# Patient Record
Sex: FEMALE | Race: WHITE
[De-identification: names, ages, dates, MRNs, and addresses within clinical notes are randomized per-mention and may not be internally consistent; named-entity substitution may affect disease eponyms.]

---

## 2019-07-22 ENCOUNTER — HOSPITAL ENCOUNTER (EMERGENCY)
Dept: HOSPITAL 97 - ER | Age: 29
LOS: 1 days | Discharge: HOME | End: 2019-07-23
Payer: COMMERCIAL

## 2019-07-22 DIAGNOSIS — K92.2: Primary | ICD-10-CM

## 2019-07-22 LAB
ALBUMIN SERPL BCP-MCNC: 4 G/DL (ref 3.4–5)
ALP SERPL-CCNC: 66 U/L (ref 45–117)
ALT SERPL W P-5'-P-CCNC: 19 U/L (ref 12–78)
AST SERPL W P-5'-P-CCNC: 7 U/L (ref 15–37)
BUN BLD-MCNC: 12 MG/DL (ref 7–18)
GLUCOSE SERPLBLD-MCNC: 93 MG/DL (ref 74–106)
HCT VFR BLD CALC: 39.4 % (ref 36–45)
INR BLD: 1
LIPASE SERPL-CCNC: 152 U/L (ref 73–393)
LYMPHOCYTES # SPEC AUTO: 3.2 K/UL (ref 0.7–4.9)
PMV BLD: 8.3 FL (ref 7.6–11.3)
POTASSIUM SERPL-SCNC: 3.6 MMOL/L (ref 3.5–5.1)
RBC # BLD: 4.35 M/UL (ref 3.86–4.86)

## 2019-07-22 PROCEDURE — 80076 HEPATIC FUNCTION PANEL: CPT

## 2019-07-22 PROCEDURE — 83690 ASSAY OF LIPASE: CPT

## 2019-07-22 PROCEDURE — 99284 EMERGENCY DEPT VISIT MOD MDM: CPT

## 2019-07-22 PROCEDURE — 36415 COLL VENOUS BLD VENIPUNCTURE: CPT

## 2019-07-22 PROCEDURE — 85025 COMPLETE CBC W/AUTO DIFF WBC: CPT

## 2019-07-22 PROCEDURE — 85610 PROTHROMBIN TIME: CPT

## 2019-07-22 PROCEDURE — 80048 BASIC METABOLIC PNL TOTAL CA: CPT

## 2019-07-22 NOTE — XMS REPORT
Patient Summary Document

 Created on:2019



Patient:MANUEL GARAY

Sex:Female

:1990

External Reference #:[id number]





Demographics







 Address  [address] [place] [address]

 

 Home Phone  [phone]

 

 Work Phone  [phone]

 

 Email Address  [email or web address]

 

 Preferred Language  Unknown

 

 Marital Status  Unknown

 

 Hoahaoism Affiliation  Unknown

 

 Race  Unknown

 

 Additional Race(s)  Unavailable

 

 Ethnic Group  Unknown









Author







 Organization  [facility]

 

 Address  [address] [place] [address] [address]



   [place] [address]

 

 Phone  [phone]









Support







 Name  Relationship  Address  Phone

 

 NOEMÍ GARAY  Unavailable  [address] [place] [address]  [phone]



     [place] [address]  

 

 NOEMÍ GARAY  Unavailable  [address] [place] [address]  [phone]



     [place] [address]  

 

 TANISHA NOEMÍ  Unavailable  [address] [place] [address]   [phone]



     [place] [address]  









Care Team Providers







 Name  Role  Phone

 

 Unavailable  Unavailable  Unavailable









Payers







 Payer Name  Policy Type  Policy Number  Effective Date  Expiration Date







Problems

This patient has no known problems.



Allergies, Adverse Reactions, Alerts

This patient has no known allergies or adverse reactions.



Medications

This patient has no known medications.



Results







 Test Description  Test Time  Test Comments  Text Results  Atomic Results  
Result Comments









 - XR HYSTROSALPNGOGRAM  2019 16:01:00     Patient Name: MANUEL GARAY
  



               Unit No: [id number]  



       EXAMS:  



                     CPT CODE:  



       [id number] XR HYSTROSALPNGOGRAM  



                     [id number]  



       HYSTEROSALPINGOGRAM,2019  



       Radiation dose:  6.88 mGy  Fluoro  



       time: 22 seconds     CLINICAL HISTORY:  



       Infertility testing     COMPARISON:  



       None     FINDINGS:  



       Hysterosalpingogram was attempted by  



       Dr. Serna.     Uterine cavity was not  



       able to be cannulated and the  



       examination was  terminated by Dr. Serna.     CONCLUSION: Unsuccessful  



       hysterosalpingogram.          **  



       Electronically Signed by Elier Anand MD on 2019 at 1601 **  



            Reported and signed by: Elier Anand MD  



         CC: Kelsie Serna  



         



         



         



       Technologist: Kavita Fernandez RT; Jennifer [place]  



       Trnscrbd D/T: 2019 (1601)  



       [id number]  



       Orig Print D/T: S: 2019 (1604)  



                                  [facility]       NAME:  



       MANUEL GARAY  



       Radiology Department  



       PHYS: REJI.Alicia - Kelsie Serna MD  



       [address]  



       : 1990 AGE: 28      SEX: F  



       [place], [place] [address]  



       ACCT NO: [id number] LOC: F.RAD  



        PHONE #: [phone]  



       EXAM DATE: 2019 STATUS: REG CLI  



          FAX #: [phone]  



       RAD NO:            Page  1  



               Signed Report

## 2019-07-23 NOTE — ER
Nurse's Notes                                                                                     

 [facility]                                                                 

Name: Bailey Cobian                                                                            

Age: 29 yrs                                                                                       

Sex: Female                                                                                       

: 1990                                                                                   

MRN: [id number]                                                                                   

Arrival Date: 2019                                                                          

Time: 21:24                                                                                       

Account#: [id number]                                                                            

Bed 23                                                                                            

Private MD:                                                                                       

Diagnosis: Gastrointestinal hemorrhage, unspecified                                               

                                                                                                  

Presentation:                                                                                     

                                                                                             

21:29 Presenting complaint: Patient states: bright red blood from rectum started at 1730      ak1 

      today with BM. pt denies constipation. pt had IUI Saturday for the 4th time. pt c/o abd     

      cramps today. pt stated blood is when she wipes. Transition of care: patient was not        

      received from another setting of care. Onset of symptoms was 2019. Risk            

      Assessment: Do you want to hurt yourself or someone else? Patient reports no desire to      

      harm self or others. Initial Sepsis Screen: Does the patient meet any 2 criteria? No.       

      Patient's initial sepsis screen is negative. Does the patient have a suspected source       

      of infection? No. Patient's initial sepsis screen is negative. Care prior to arrival:       

      None.                                                                                       

21:29 Method Of Arrival: Ambulatory                                                           ak1 

21:29 Acuity: EMILIANA 3                                                                           ak1 

                                                                                                  

Triage Assessment:                                                                                

21:31 General: Appears in no apparent distress. Behavior is cooperative, anxious. Pain:       ak1 

      Complains of pain in pelvis.                                                                

                                                                                                  

OB/GYN:                                                                                           

21:31 LMP 7/10/2019                                                                           ak1 

                                                                                                  

Historical:                                                                                       

- Allergies:                                                                                      

21:31 No Known Allergies;                                                                     ak1 

- Home Meds:                                                                                      

21:31 None [Active];                                                                          ak1 

- PMHx:                                                                                           

21:31 None;                                                                                   ak1 

- PSHx:                                                                                           

21:31 None;                                                                                   ak1 

                                                                                                  

- Immunization history:: Adult Immunizations up to date.                                          

- Social history:: Smoking status: Patient/guardian denies using tobacco.                         

- Ebola Screening: : No symptoms or risks identified at this time.                                

                                                                                                  

                                                                                                  

Screenin:32 Abuse screen: Denies threats or abuse. Denies injuries from another. Nutritional        ak1 

      screening: No deficits noted. Tuberculosis screening: No symptoms or risk factors           

      identified. Fall Risk None identified.                                                      

                                                                                                  

Assessment:                                                                                       

22:20 General: Appears in no apparent distress. comfortable, Behavior is calm, cooperative,   ca1 

      appropriate for age. Pain: Denies pain. Neuro: Level of Consciousness is awake, alert,      

      obeys commands, Oriented to person, place, time, situation, Appropriate for age.            

      Cardiovascular: Heart tones S1 S2 present Capillary refill < 3 seconds Patient's skin       

      is warm and dry. Pulses. Respiratory: Airway is patent Respiratory effort is even,          

      unlabored, Respiratory pattern is regular, symmetrical, Breath sounds are clear             

      bilaterally. GI: Abdomen is flat, non-distended, Bowel sounds present X 4 quads. Abd is     

      soft and non tender X 4 quads. Reports rectal bleeding, since 1700H today. : No           

      deficits noted. No signs and/or symptoms were reported regarding the genitourinary          

      system. EENT: No deficits noted. No signs and/or symptoms were reported regarding the       

      EENT system. Derm: Skin is intact, is healthy with good turgor, Skin is pink, warm \T\      

      dry. Musculoskeletal: Circulation, motion, and sensation intact. Capillary refill < 3       

      seconds, Range of motion: intact in all extremities.                                        

23:20 Reassessment: Patient appears in no apparent distress at this time. No changes from     ca1 

      previously documented assessment. Patient and/or family updated on plan of care and         

      expected duration. Pain level reassessed. Patient is alert, oriented x 3, equal             

      unlabored respirations, skin warm/dry/pink.                                                 

                                                                                             

00:39 Reassessment: Patient appears in no apparent distress at this time. Patient is alert,   ca1 

      oriented x 3, equal unlabored respirations, skin warm/dry/pink.                             

                                                                                                  

Vital Signs:                                                                                      

                                                                                             

21:31  / 76; Pulse 82; Resp 16; Temp 97.7; Pulse Ox 98% on R/A; Weight 56.7 kg (R);     ak1 

      Height 5 ft. 4 in. (162.56 cm) (R); Pain 2/10;                                              

22:20  / 70; Pulse 72; Resp 16 S; Pulse Ox 100% on R/A;                                 ca1 

23:20  / 74; Pulse 76; Resp 16 S; Pulse Ox 100% on R/A;                                 ca1 

                                                                                             

00:30  / 63; Pulse 66; Resp 15 S; Temp 97.9(O); Pulse Ox 99% on R/A;                    ca1 

                                                                                             

21:31 Body Mass Index 21.46 (56.70 kg, 162.56 cm)                                             ak1 

                                                                                                  

ED Course:                                                                                        

                                                                                             

21:24 Patient arrived in ED.                                                                  es  

21:31 Triage completed.                                                                       ak1 

21:31 Arm band placed on Patient placed in waiting room, Patient notified of wait time.       ak1 

21:32 Patient has correct armband on for positive identification.                             ak1 

22:05 Dominic Rendon PA is PHCP.                                                                cp  

22:05 Wallace Hernández MD is Attending Physician.                                            cp  

22:06 Shaista Carmona, RN is Primary Nurse.                                                      ca1 

22:20 Placed in gown. Bed in low position. Call light in reach. Side rails up X 1. Pulse ox   ca1 

      on. NIBP on. Warm blanket given.                                                            

23:20 No provider procedures requiring assistance completed. Inserted saline lock: 22 gauge   ca1 

      in right antecubital area, using aseptic technique. Blood collected.                        

                                                                                             

00:00 Seng Cole MD is Referral Physician.                                              cp  

00:40 IV discontinued, intact, bleeding controlled, No redness/swelling at site. Pressure     ca1 

      dressing applied.                                                                           

                                                                                                  

Administered Medications:                                                                         

No medications were administered                                                                  

                                                                                                  

                                                                                                  

Outcome:                                                                                          

00:01 Discharge ordered by MD.                                                                cp  

00:40 Discharged to home ambulatory, with significant other.                                  ca1 

00:40 Condition: stable                                                                           

00:40 Discharge instructions given to patient, Instructed on discharge instructions, follow       

      up and referral plans. Demonstrated understanding of instructions, follow-up care.          

00:42 Patient left the ED.                                                                    ca1 

                                                                                                  

Signatures:                                                                                       

Elissa Phelan Amber, RN                       RN   ak1                                                  

Dominic Rendon PA PA cp Acob, Cheryl, RN                        RN   ca1                                                  

                                                                                                  

**************************************************************************************************

## 2019-07-23 NOTE — EDPHYS
Physician Documentation                                                                           

 [facility]                                                                 

Name: Bailey Cobian                                                                            

Age: 29 yrs                                                                                       

Sex: Female                                                                                       

: 1990                                                                                   

MRN: [id number]                                                                                   

Arrival Date: 2019                                                                          

Time: 21:24                                                                                       

Account#: [id number]                                                                            

Bed 23                                                                                            

Private MD:                                                                                       

ED Physician Wallace Hernández                                                                     

HPI:                                                                                              

                                                                                             

23:25 This 29 yrs old  Female presents to ER via Ambulatory with complaints of       cp  

      Rectal Bleeding.                                                                            

23:25 The patient presents to the emergency department with bleeding from the rectum/anus,    cp  

      that is mild. Onset: The symptoms/episode began/occurred today. Associate signs and         

      symptoms: Pertinent negatives: abdominal pain, constipation, diarrhea, fever, vomiting.     

      Patient reports having lower abdominal cramping pain today and noticing blood with firm     

      bowel movement today.                                                                       

23:25 Patient reports having intrauterine pregnancy procedure this past Saturday.             cp  

                                                                                                  

OB/GYN:                                                                                           

21:31 LMP 7/10/2019                                                                           ak1 

                                                                                                  

Historical:                                                                                       

- Allergies:                                                                                      

21:31 No Known Allergies;                                                                     ak1 

- Home Meds:                                                                                      

21:31 None [Active];                                                                          ak1 

- PMHx:                                                                                           

21:31 None;                                                                                   ak1 

- PSHx:                                                                                           

21:31 None;                                                                                   ak1 

                                                                                                  

- Immunization history:: Adult Immunizations up to date.                                          

- Social history:: Smoking status: Patient/guardian denies using tobacco.                         

- Ebola Screening: : No symptoms or risks identified at this time.                                

                                                                                                  

                                                                                                  

ROS:                                                                                              

23:30 Constitutional: Negative for body aches, chills, fever, poor PO intake.                 cp  

23:30 Eyes: Negative for injury, pain, redness, and discharge.                                cp  

23:30 Cardiovascular: Negative for chest pain, palpitations.                                      

23:30 Respiratory: Negative for cough, shortness of breath, wheezing.                             

23:30 Abdomen/GI: Positive for blood in stool, Negative for vomiting, diarrhea, constipation,     

      anorexia.                                                                                   

23:30 Back: Negative for pain at rest, pain with movement, radiated pain.                         

23:30 : Negative for urinary symptoms, vaginal bleeding.                                        

23:30 Neuro: Negative for altered mental status, headache, weakness.                              

23:30 All other systems are negative.                                                             

                                                                                                  

Exam:                                                                                             

23:35 Constitutional: The patient appears in no acute distress, alert, awake, non-toxic, well cp  

      developed, well nourished.                                                                  

23:35 Head/Face:  Normocephalic, atraumatic.                                                  cp  

23:35 Eyes: Periorbital structures: appear normal, Conjunctiva: normal, no exudate, no            

      injection, Sclera: no appreciated abnormality, Lids and lashes: appear normal,              

      bilaterally.                                                                                

23:35 ENT: External ear(s): are unremarkable, Nose: is normal, Mouth: is normal, Posterior        

      pharynx: is normal, airway is patent, no erythema, no exudate.                              

23:35 Chest/axilla: Inspection: normal, Palpation: is normal, no crepitus, no tenderness.         

23:35 Cardiovascular: Rate: normal, Rhythm: regular.                                              

23:35 Respiratory: the patient does not display signs of respiratory distress,  Respirations:     

      normal, no use of accessory muscles, no retractions, no splinting, no tachypnea,            

      labored breathing, is not present, Breath sounds: are clear throughout, no decreased        

      breath sounds, no stridor, no wheezing.                                                     

23:35 Abdomen/GI: Inspection: abdomen appears normal, Bowel sounds: active, all quadrants,        

      Palpation: soft, in all quadrants, nontender, in all quadrants, rebound tenderness, is      

      not appreciated, voluntary guarding, is not appreciated, involuntary guarding, is not       

      appreciated, Rectal exam: Stool: brown, guaiac positive.                                    

                                                                                                  

Vital Signs:                                                                                      

21:31  / 76; Pulse 82; Resp 16; Temp 97.7; Pulse Ox 98% on R/A; Weight 56.7 kg (R);     ak1 

      Height 5 ft. 4 in. (162.56 cm) (R); Pain 2/10;                                              

22:20  / 70; Pulse 72; Resp 16 S; Pulse Ox 100% on R/A;                                 ca1 

23:20  / 74; Pulse 76; Resp 16 S; Pulse Ox 100% on R/A;                                 ca1 

                                                                                             

00:30  / 63; Pulse 66; Resp 15 S; Temp 97.9(O); Pulse Ox 99% on R/A;                    ca1 

                                                                                             

21:31 Body Mass Index 21.46 (56.70 kg, 162.56 cm)                                             ak1 

                                                                                                  

MDM:                                                                                              

                                                                                             

22:11 Patient medically screened.                                                             cp  

23:30 Differential diagnosis: hemorrhoids, fissure, colitis.                                    

                                                                                             

00:01 Data reviewed: vital signs, nurses notes, lab test result(s), and as a result, I will   cp  

      discharge patient.                                                                          

00:01 Refusal of service: The patient/guardian displays adequate decision making capability   cp  

      and despite a detailed discussion of alternatives, benefits, risks, and consequences        

      refuses: CT Scan. Special discussion: Based on the patient's Hx, exam, and Dx               

      evaluation, there is no indication for emergent surgery or inpatient Tx. It is              

      understood by the patient/guardian that if the Sx's persist or worsen they need to          

      return immediately for re-evaluation.                                                       

                                                                                                  

                                                                                             

23:17 Order name: Basic Metabolic Panel; Complete Time: 23:56                                   

                                                                                             

23:56 Interpretation: Normal except: .                                                    

                                                                                             

23:17 Order name: CBC with Diff; Complete Time: 23:56                                           

                                                                                             

23:56 Interpretation: Reviewed.                                                                 

                                                                                             

23:17 Order name: Creatinine for Radiology; Complete Time: 23:56                                

                                                                                             

23:17 Order name: Hepatic Function; Complete Time: 23:56                                        

                                                                                             

23:17 Order name: Lipase; Complete Time: 23:56                                                  

                                                                                             

23:17 Order name: PT-INR; Complete Time: 23:56                                                  

                                                                                             

23:17 Order name: IV Saline Lock; Complete Time: 00:29                                          

                                                                                             

23:17 Order name: Labs collected and sent; Complete Time: 00:29                               cp  

                                                                                                  

Administered Medications:                                                                         

No medications were administered                                                                  

                                                                                                  

                                                                                                  

Disposition:                                                                                      

19 00:01 Discharged to Home. Impression: Gastrointestinal hemorrhage, unspecified.          

- Condition is Stable.                                                                            

- Discharge Instructions: Rectal Bleeding.                                                        

                                                                                                  

- Medication Reconciliation Form, Thank You Letter, Antibiotic Education, Prescription            

  Opioid Use form.                                                                                

- Follow up: Seng Cole MD; When: 1 - 2 days; Reason: Recheck today's complaints.           

- Problem is new.                                                                                 

- Symptoms have improved.                                                                         

                                                                                                  

                                                                                                  

                                                                                                  

Addendum:                                                                                         

2019                                                                                        

     06:09 Co-signature as Attending Physician, Wallace Hernández MD I agree with the assessment and t
w4

           plan of care.                                                                          

                                                                                                  

Signatures:                                                                                       

Dispatcher [employer]                           Eliana Urena, RN                       RN   ak1                                                  

Dominic Rendon PA PA cp Wadley, Terrence, MD MD   tw4                                                  

Shaista Carmona RN                        RN   ca1                                                  

                                                                                                  

Corrections: (The following items were deleted from the chart)                                    

                                                                                             

00:42 00:01 2019 00:01 Discharged to Home. Impression: Gastrointestinal hemorrhage,     ca1 

      unspecified. Condition is Stable. Forms are Medication Reconciliation Form, Thank You       

      Letter, Antibiotic Education, Prescription Opioid Use. Follow up: Seng Cole; When:     

      1 - 2 days; Reason: Recheck today's complaints. Problem is new. Symptoms have improved.     

      cp                                                                                          

                                                                                                  

**************************************************************************************************